# Patient Record
Sex: MALE | Race: WHITE | NOT HISPANIC OR LATINO | Employment: FULL TIME | RURAL
[De-identification: names, ages, dates, MRNs, and addresses within clinical notes are randomized per-mention and may not be internally consistent; named-entity substitution may affect disease eponyms.]

---

## 2019-09-30 ENCOUNTER — OFFICE VISIT (OUTPATIENT)
Dept: NEUROSURGERY | Facility: CLINIC | Age: 34
End: 2019-09-30

## 2019-09-30 DIAGNOSIS — M51.26 HERNIATION OF INTERVERTEBRAL DISC OF LUMBAR SPINE: Primary | ICD-10-CM

## 2019-09-30 PROCEDURE — 99243 OFF/OP CNSLTJ NEW/EST LOW 30: CPT | Performed by: NEUROLOGICAL SURGERY

## 2019-09-30 RX ORDER — OMEPRAZOLE 20 MG/1
CAPSULE, DELAYED RELEASE ORAL
Refills: 0 | COMMUNITY
Start: 2019-09-09

## 2019-09-30 RX ORDER — LORATADINE 10 MG/1
TABLET ORAL
Refills: 0 | COMMUNITY
Start: 2019-09-09

## 2019-09-30 RX ORDER — IBUPROFEN 800 MG/1
TABLET ORAL
Refills: 0 | COMMUNITY
Start: 2019-08-03

## 2019-09-30 NOTE — PROGRESS NOTES
Subjective   Patient ID: Beltran Pandya is a 33 y.o. male is being seen for consultation today at the request of CATHY Herring*  Chief Complaint: Back and leg pain    History of Present Illness: The patient is a 33-year-old man who currently works as a  in [place].  He has done this for about a year.  He has had back pain with pain radiating to the right hip and leg principally and somewhat to the left hip and leg for years, occurring with standing or walking or bending or other physical activity.  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  He was in the [employer] for about 5 months but did not complete his certification because of back and leg pain.  He finished that in May of this year.  He is still working full-time.  He had physical therapy in the past which did not make any difference in symptoms.  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  He has to be able to pass a physical fitness test at the time.    Review of Radiographic Studies:  MRI scan shows a single level degenerative disc at L4-5 with a broad-based central herniation and bilateral lateral recess stenosis compressing the L5 nerve roots on each side    The following portions of the patient's history were reviewed, updated as appropriate and approved: allergies, current medications, past family history, past medical history, past social history, past surgical history, review of systems and problem list.  Review of Systems   Constitutional: Negative for activity change, appetite change, chills, diaphoresis, fatigue, fever and unexpected weight change.   HENT: Negative for congestion, dental problem, drooling, ear discharge, ear pain, facial swelling, hearing loss, mouth sores, nosebleeds, postnasal drip, rhinorrhea, sinus pressure, sneezing, sore throat, tinnitus, trouble swallowing and voice change.    Eyes: Negative for photophobia, pain,  discharge, redness, itching and visual disturbance.   Respiratory: Negative for apnea, cough, choking, chest tightness, shortness of breath, wheezing and stridor.    Cardiovascular: Negative for chest pain, palpitations and leg swelling.   Gastrointestinal: Negative for abdominal distention, abdominal pain, anal bleeding, blood in stool, constipation, diarrhea, nausea, rectal pain and vomiting.   Endocrine: Negative for cold intolerance, heat intolerance, polydipsia, polyphagia and polyuria.   Genitourinary: Negative for decreased urine volume, difficulty urinating, dysuria, enuresis, flank pain, frequency, genital sores, hematuria and urgency.   Musculoskeletal: Positive for back pain. Negative for arthralgias, gait problem, joint swelling, myalgias, neck pain and neck stiffness.   Skin: Negative for color change, pallor, rash and wound.   Allergic/Immunologic: Negative for environmental allergies, food allergies and immunocompromised state.   Neurological: Positive for numbness. Negative for dizziness, tremors, seizures, syncope, facial asymmetry, speech difficulty, weakness, light-headedness and headaches.   Hematological: Negative for adenopathy. Does not bruise/bleed easily.   Psychiatric/Behavioral: Negative for agitation, behavioral problems, confusion, decreased concentration, dysphoric mood, hallucinations, self-injury, sleep disturbance and suicidal ideas. The patient is not nervous/anxious and is not hyperactive.        Objective     NEUROLOGICAL EXAMINATION:      MENTAL STATUS:  Alert and oriented.  Speech intact.  Recent and remote memory intact.      CRANIAL NERVES:  Cranial nerve II:  Visual fields are full.  Cranial nerves III, IV and VI:  PERRLADC.  Extraocular movements are intact.  Nystagmus is not present.  Cranial nerve V:  Facial sensation is intact.  Cranial nerve VII:  Muscles of facial expression reveal no asymmetry.  Cranial nerve VIII:  Hearing is intact.  Cranial nerves IX and X:  Palate  elevates symmetrically.  Cranial nerve XI:  Shoulder shrug is intact.  Cranial nerve XII:  Tongue is midline without evidence of atrophy or fasciculation.    MUSCULOSKELETAL: SLR +45 degrees on the right, 60 degrees on the left.    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       Plan   I estimate the bilateral surgical discectomy has about a 70% chance of alleviating the radiculopathy and giving him a long-term better prospect.  This would require about 3 months off work with rehab.  He is unable to take the time off.  As long he is he is able to continue on his current job do so.  If he is unable to continue on the job surgery would be a better prospect.       Janes Mckeon MD

## 2021-03-23 ENCOUNTER — BULK ORDERING (OUTPATIENT)
Dept: CASE MANAGEMENT | Facility: OTHER | Age: 36
End: 2021-03-23

## 2021-03-23 DIAGNOSIS — Z23 IMMUNIZATION DUE: ICD-10-CM
